# Patient Record
Sex: MALE
[De-identification: names, ages, dates, MRNs, and addresses within clinical notes are randomized per-mention and may not be internally consistent; named-entity substitution may affect disease eponyms.]

---

## 2018-05-02 PROBLEM — Z00.00 ENCOUNTER FOR PREVENTIVE HEALTH EXAMINATION: Status: ACTIVE | Noted: 2018-05-02

## 2018-05-11 ENCOUNTER — APPOINTMENT (OUTPATIENT)
Dept: INTERVENTIONAL RADIOLOGY/VASCULAR | Facility: CLINIC | Age: 68
End: 2018-05-11

## 2019-02-05 ENCOUNTER — APPOINTMENT (OUTPATIENT)
Dept: ORTHOPEDIC SURGERY | Facility: CLINIC | Age: 69
End: 2019-02-05
Payer: MEDICARE

## 2019-02-05 DIAGNOSIS — M89.9 DISORDER OF BONE, UNSPECIFIED: ICD-10-CM

## 2019-02-05 PROCEDURE — 73560 X-RAY EXAM OF KNEE 1 OR 2: CPT | Mod: RT

## 2019-02-05 PROCEDURE — 99203 OFFICE O/P NEW LOW 30 MIN: CPT

## 2019-02-05 NOTE — PHYSICAL EXAM
[FreeTextEntry1] : Physical exam reveals a healthy-looking patient in no apparent distress patient appeared to be fully alert oriented and in no complaint most of the pain over the right knee it subsided. On exam there is no swelling no palpable mass no joint effusion review all the plain x-ray of the right knee demonstrate a benign-appearing nonaggressive bony changes being an incidental finding at this stage patient was recommended to be followed and to be seen as needed

## 2019-02-05 NOTE — HISTORY OF PRESENT ILLNESS
[FreeTextEntry1] : This is the first visit over 68 years old male recently sustained a contusion over the right knee x-ray demonstrated an incidental finding of a nonaggressive benign-appearing unrelated bony changes over the proximal tibia proximal fibula suggesting completely benign reactive process.